# Patient Record
Sex: MALE | Race: WHITE | NOT HISPANIC OR LATINO | URBAN - METROPOLITAN AREA
[De-identification: names, ages, dates, MRNs, and addresses within clinical notes are randomized per-mention and may not be internally consistent; named-entity substitution may affect disease eponyms.]

---

## 2024-06-12 ENCOUNTER — OFFICE VISIT (OUTPATIENT)
Dept: URGENT CARE | Facility: PHYSICIAN GROUP | Age: 34
End: 2024-06-12

## 2024-06-12 VITALS
WEIGHT: 113 LBS | DIASTOLIC BLOOD PRESSURE: 66 MMHG | BODY MASS INDEX: 17.74 KG/M2 | HEART RATE: 62 BPM | OXYGEN SATURATION: 100 % | RESPIRATION RATE: 16 BRPM | TEMPERATURE: 98.6 F | HEIGHT: 67 IN | SYSTOLIC BLOOD PRESSURE: 114 MMHG

## 2024-06-12 DIAGNOSIS — Z02.4 ENCOUNTER FOR COMMERCIAL DRIVER MEDICAL EXAMINATION (CDME): ICD-10-CM

## 2024-06-12 PROCEDURE — 7100 PR DOT PHYSICAL: Performed by: STUDENT IN AN ORGANIZED HEALTH CARE EDUCATION/TRAINING PROGRAM

## 2024-06-13 NOTE — PROGRESS NOTES
Patient here for DOT certification.    He has no chronic medical conditions and does not take any daily medications.     reviewed without any discrepancies    Borderline visual acuity and instructed to follow-up with an eye doctor for possible prescription    Recertification 2 years    See scanned documentation for full details.

## 2024-08-13 ENCOUNTER — OFFICE VISIT (OUTPATIENT)
Dept: URGENT CARE | Facility: PHYSICIAN GROUP | Age: 34
End: 2024-08-13
Payer: COMMERCIAL

## 2024-08-13 VITALS
BODY MASS INDEX: 16.59 KG/M2 | TEMPERATURE: 98.8 F | RESPIRATION RATE: 14 BRPM | HEIGHT: 69 IN | SYSTOLIC BLOOD PRESSURE: 112 MMHG | WEIGHT: 112 LBS | OXYGEN SATURATION: 99 % | HEART RATE: 61 BPM | DIASTOLIC BLOOD PRESSURE: 70 MMHG

## 2024-08-13 DIAGNOSIS — B34.9 VIRAL ILLNESS: ICD-10-CM

## 2024-08-13 PROCEDURE — 99212 OFFICE O/P EST SF 10 MIN: CPT | Performed by: FAMILY MEDICINE

## 2024-08-13 PROCEDURE — 3074F SYST BP LT 130 MM HG: CPT | Performed by: FAMILY MEDICINE

## 2024-08-13 PROCEDURE — 3078F DIAST BP <80 MM HG: CPT | Performed by: FAMILY MEDICINE

## 2024-08-13 ASSESSMENT — ENCOUNTER SYMPTOMS
MYALGIAS: 0
EYE REDNESS: 0
NAUSEA: 0
WEIGHT LOSS: 0
VOMITING: 0
EYE DISCHARGE: 0

## 2024-08-13 NOTE — LETTER
August 13, 2024         Patient: Malika Dowell   YOB: 1990   Date of Visit: 8/13/2024           To Whom it May Concern:    Malika Dowell was seen in my clinic on 8/13/2024. Please excuse from work 8/9 through 8/13/2024. He may return to full duty tomorrow 8/14/2024.     Sincerely,           Jefe Hernandez M.D.  Electronically Signed

## 2024-08-13 NOTE — PROGRESS NOTES
"Subjective     Juaquind Steven Dowell is a 34 y.o. male who presents with Letter for School/Work (Was out for past 3 days )            4 days with initial headache, body ache, and fever/chills.  No cough.  No neck stiffness.  No sore throat.  Symptoms overall have improved and nearly resolved.  He would like to go back to work tomorrow and needs a note.  He got a mild associated facial rash during this illness.  No other aggravating or alleviating factors.        Review of Systems   Constitutional:  Negative for malaise/fatigue and weight loss.   Eyes:  Negative for discharge and redness.   Gastrointestinal:  Negative for nausea and vomiting.   Musculoskeletal:  Negative for joint pain and myalgias.              Objective     /70   Pulse 61   Temp 37.1 °C (98.8 °F) (Temporal)   Resp 14   Ht 1.753 m (5' 9\")   Wt 50.8 kg (112 lb)   SpO2 99%   BMI 16.54 kg/m²      Physical Exam  Constitutional:       General: He is not in acute distress.     Appearance: He is well-developed.   HENT:      Head: Normocephalic and atraumatic.      Nose: Nose normal. No congestion.      Mouth/Throat:      Mouth: Mucous membranes are moist.      Pharynx: No posterior oropharyngeal erythema.   Eyes:      Conjunctiva/sclera: Conjunctivae normal.   Cardiovascular:      Rate and Rhythm: Normal rate and regular rhythm.      Heart sounds: Normal heart sounds. No murmur heard.  Pulmonary:      Effort: Pulmonary effort is normal.      Breath sounds: Normal breath sounds. No wheezing.   Skin:     General: Skin is warm and dry.   Neurological:      Mental Status: He is alert.                             Assessment & Plan        1. Viral illness          Differential diagnosis, natural history, supportive care, and indications for immediate follow-up were discussed.     Work note written      "